# Patient Record
Sex: FEMALE | NOT HISPANIC OR LATINO | URBAN - METROPOLITAN AREA
[De-identification: names, ages, dates, MRNs, and addresses within clinical notes are randomized per-mention and may not be internally consistent; named-entity substitution may affect disease eponyms.]

---

## 2018-12-04 NOTE — PATIENT DISCUSSION
Recommended artificial tears to use: 1 drop 4x a day in both eyes.  ED use daily with extent of CPU use.

## 2019-01-10 NOTE — PROCEDURE NOTE: CLINICAL
PROCEDURE NOTE: Excision of Eyelid Lesion Right Upper Lid. Diagnosis: Eyelid Papilloma. Prior to treatment, the risks/benefits/alternatives were discussed. The patient wished to proceed with procedure. Local anesthetic was given. The eyelid was prepped and draped for procedure. The lesion was incised and removed. The wound was repaired with sutures. Patient tolerated procedure well. There were no complications. Post procedure instructions given. Em Tamayo PROCEDURE NOTE: Biopsy of Eyelid Right Upper Lid. Diagnosis: Eyelid Papilloma. Prior to treatment, the risks/benefits/alternatives were discussed. The patient wished to proceed with procedure. The area of the surgical site was prepped surgical scrub. 0.5 cc of 2% lidocaine with epinephrine was injected beneath the lesion. The lesion was excised with Thong scissors. The defect was cauterized. Erythromycin ointment was placed on the biopsy site. Patient tolerated procedure well. There were no complications. The lesion was placed in formalin and sent to a pathology lab. Post procedure instructions given. Em Tamayo

## 2020-01-31 ENCOUNTER — IMPORTED ENCOUNTER (OUTPATIENT)
Dept: URBAN - METROPOLITAN AREA CLINIC 1 | Facility: CLINIC | Age: 85
End: 2020-01-31

## 2020-01-31 PROBLEM — H35.3124: Noted: 2020-01-31

## 2020-01-31 PROCEDURE — 92015 DETERMINE REFRACTIVE STATE: CPT

## 2020-01-31 PROCEDURE — 92004 COMPRE OPH EXAM NEW PT 1/>: CPT

## 2020-01-31 PROCEDURE — 92250 FUNDUS PHOTOGRAPHY W/I&R: CPT

## 2020-01-31 NOTE — PATIENT DISCUSSION
1.  ARMD OS Advanced with subfoveal involvement/dry. Mac photos show advanced ARMD OS. Importance of daily AREDS II study multivitamin and Amsler Grid checks discussed with patient. Patient to follow-up immediately with any new onset of decreased vision and/or metamorphopsia. 2. 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 Patient having irritation OD- Advised patient to remove prosthesis OD and clean it once daily with sterile saline solution. Will recheck patient in 3 weeks. 3.  Pseudophakia OS (Done elsewhere) Letter to PCP Joyce Norman for an appointment in 3 week 10 with Dr. Christina Kuhn.

## 2020-02-24 ENCOUNTER — IMPORTED ENCOUNTER (OUTPATIENT)
Dept: URBAN - METROPOLITAN AREA CLINIC 1 | Facility: CLINIC | Age: 85
End: 2020-02-24

## 2020-02-24 PROBLEM — H01.004: Noted: 2020-02-24

## 2020-02-24 PROBLEM — H01.001: Noted: 2020-02-24

## 2020-02-24 PROBLEM — H10.503: Noted: 2020-02-24

## 2020-02-24 PROBLEM — Z97.0: Noted: 2020-02-24

## 2020-02-24 PROCEDURE — 92012 INTRM OPH EXAM EST PATIENT: CPT

## 2020-02-24 NOTE — PATIENT DISCUSSION
1.  Blepharoconjunctivitis OD related to Prothesis OD - Still experiencing irritation despite saline use. Recommend no prosthetic wear until seen. Begin Tobradex QID OD (erx). Advised to continue daily cleaning with saline solution. *May need long term low-dose steroid/AVX to control. 2.  Anterior Blepharitis OU - Daily Hot compresses and lid scrubs were recommended. 3. Ho Advanced ARMD OSReturn for an appointment in Friday 10 (Consider long term low-dose steroid/AVX to control) with Dr. Allan Caraballo.

## 2020-02-28 ENCOUNTER — IMPORTED ENCOUNTER (OUTPATIENT)
Dept: URBAN - METROPOLITAN AREA CLINIC 1 | Facility: CLINIC | Age: 85
End: 2020-02-28

## 2020-02-28 PROBLEM — H02.102: Noted: 2020-02-28

## 2020-02-28 PROCEDURE — 92012 INTRM OPH EXAM EST PATIENT: CPT

## 2020-02-28 NOTE — PATIENT DISCUSSION
Anterior Blepharitis OU - Daily Hot compresses and lid scrubs were recommended. 3.   Ho Advanced ARMD OS

## 2020-02-28 NOTE — PATIENT DISCUSSION
1.  Ectropion RLL - Will refer to [facility] for Ectropion RLL eval. Ectropion RLL is causing irritation with or without prosthetic. DC Tobradex. Recommend PF ATs Q1-2H OD. Begin Erythromycin ari QHS OD (erx). 2.  Anterior Blepharitis OU - Daily Hot compresses and lid scrubs were recommended. 3. Ho Advanced ARMD OSReturn for an appointment in July 10/DFE with Dr. June Michel.

## 2020-07-07 ENCOUNTER — IMPORTED ENCOUNTER (OUTPATIENT)
Dept: URBAN - METROPOLITAN AREA CLINIC 1 | Facility: CLINIC | Age: 85
End: 2020-07-07

## 2020-07-07 PROBLEM — H01.001: Noted: 2020-07-07

## 2020-07-07 PROBLEM — H35.3124: Noted: 2020-07-07

## 2020-07-07 PROBLEM — H01.004: Noted: 2020-07-07

## 2020-07-07 PROCEDURE — 92012 INTRM OPH EXAM EST PATIENT: CPT

## 2020-07-07 PROCEDURE — 92015 DETERMINE REFRACTIVE STATE: CPT

## 2020-07-07 NOTE — PATIENT DISCUSSION
1.  ARMD OS Advanced with subfoveal involvement/dry/stable. Importance of daily AREDS II study multivitamin and Amsler Grid checks discussed with patient. Patient to follow-up immediately with any new onset of decreased vision and/or metamorphopsia. 2. Anterior Blepharitis OU - Daily Hot compresses and lid scrubs were recommended. 3. Pseudophakia OS - (Done Elsewhere) 4. 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 5. S/p Ectropion RLL Repair - Jaspal Ware) MRX for glasses given. Return for an appointment in 6 months 27 with Dr. Zahraa Germain.

## 2021-01-07 ENCOUNTER — IMPORTED ENCOUNTER (OUTPATIENT)
Dept: URBAN - METROPOLITAN AREA CLINIC 1 | Facility: CLINIC | Age: 86
End: 2021-01-07

## 2021-01-07 PROBLEM — H01.001: Noted: 2021-01-07

## 2021-01-07 PROBLEM — Z96.1: Noted: 2021-01-07

## 2021-01-07 PROBLEM — H01.004: Noted: 2021-01-07

## 2021-01-07 PROBLEM — H35.3124: Noted: 2021-01-07

## 2021-01-07 PROCEDURE — 92014 COMPRE OPH EXAM EST PT 1/>: CPT

## 2021-01-07 PROCEDURE — 92015 DETERMINE REFRACTIVE STATE: CPT

## 2021-01-07 NOTE — PATIENT DISCUSSION
1.  ARMD OS advanced with subfoveal involvement/dry/stable. Importance of daily AREDS II study multivitamin and Amsler Grid checks discussed with patient. Patient to follow-up immediately with any new onset of decreased vision and/or metamorphopsia. 2. Anterior Blepharitis OU - Daily Hot compresses and lid scrubs were recommended. 3. Pseudophakia OS - (Done Elsewhere) 4. 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 Recommend continue daily cleaning with saline solution. 5.  S/p Ectropion RLL Repair - (Mary Alice)Finalized glasses Mrx today. Return for an appointment in 6 months for a 10/DFE with Dr. Dariusz Madison.

## 2021-07-13 ENCOUNTER — IMPORTED ENCOUNTER (OUTPATIENT)
Dept: URBAN - METROPOLITAN AREA CLINIC 1 | Facility: CLINIC | Age: 86
End: 2021-07-13

## 2021-07-13 PROBLEM — H35.3124: Noted: 2021-07-13

## 2021-07-13 PROCEDURE — 92012 INTRM OPH EXAM EST PATIENT: CPT

## 2021-07-13 PROCEDURE — 92015 DETERMINE REFRACTIVE STATE: CPT

## 2021-07-13 NOTE — PATIENT DISCUSSION
1.  ARMD OS advanced with subfoveal involvement/dry/stable. Importance of daily AREDS II study multivitamin and Amsler Grid checks discussed with patient. Patient to follow-up immediately with any new onset of decreased vision and/or metamorphopsia. 2. Anterior Blepharitis OU -- Daily Hot compresses and lid scrubs were recommended. 3. Pseudophakia OS -- (Done Elsewhere) 4. 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 Recommend continue daily cleaning with saline solution. 5.  S/p Ectropion RLL Repair -- (Dr. Kaur Marking. Dry Eyes with PEK OS -- Recommend ATs BID-TID OS routinely. MRX for glasses given. (Recommend separate pair of RX reading glasses)Return for an appointment in 11 month 27 with Dr. Dashawn Archer.

## 2022-04-08 ASSESSMENT — VISUAL ACUITY
OS_SC: 20/25-2
OS_SC: 20/20
OS_SC: 20/25
OS_CC: J1
OS_CC: 20/50
OS_CC: J1
OS_SC: 20/20-2
OS_SC: J16
OS_SC: 20/20
OS_CC: 20/60-1
OS_SC: 20/30-2
OS_GLARE: 20/100
OS_CC: J1

## 2022-04-08 ASSESSMENT — TONOMETRY
OS_IOP_MMHG: 15
OS_IOP_MMHG: 21
OS_IOP_MMHG: 16
OS_IOP_MMHG: 17
OS_IOP_MMHG: 15
OS_IOP_MMHG: 16

## 2023-05-01 ENCOUNTER — COMPREHENSIVE EXAM (OUTPATIENT)
Dept: URBAN - METROPOLITAN AREA CLINIC 1 | Facility: CLINIC | Age: 88
End: 2023-05-01

## 2023-05-01 DIAGNOSIS — H04.122: ICD-10-CM

## 2023-05-01 DIAGNOSIS — Z96.1: ICD-10-CM

## 2023-05-01 DIAGNOSIS — H35.3124: ICD-10-CM

## 2023-05-01 DIAGNOSIS — H16.142: ICD-10-CM

## 2023-05-01 DIAGNOSIS — Z98.890: ICD-10-CM

## 2023-05-01 PROCEDURE — 99214 OFFICE O/P EST MOD 30 MIN: CPT

## 2023-05-01 PROCEDURE — 92015 DETERMINE REFRACTIVE STATE: CPT

## 2023-05-01 ASSESSMENT — TONOMETRY: OS_IOP_MMHG: 16

## 2023-05-01 ASSESSMENT — VISUAL ACUITY: OS_CC: 20/100
